# Patient Record
Sex: FEMALE | Race: WHITE | Employment: PART TIME | URBAN - METROPOLITAN AREA
[De-identification: names, ages, dates, MRNs, and addresses within clinical notes are randomized per-mention and may not be internally consistent; named-entity substitution may affect disease eponyms.]

---

## 2017-04-14 ENCOUNTER — HOSPITAL ENCOUNTER (OUTPATIENT)
Dept: MRI IMAGING | Age: 42
Discharge: HOME | End: 2017-04-14
Attending: FAMILY MEDICINE
Payer: MEDICAID

## 2017-04-14 DIAGNOSIS — G89.29 CHRONIC BACK PAIN: ICD-10-CM

## 2017-04-14 DIAGNOSIS — M54.9 CHRONIC BACK PAIN: ICD-10-CM

## 2017-04-14 PROCEDURE — 72148 MRI LUMBAR SPINE W/O DYE: CPT

## 2017-11-14 ENCOUNTER — HOSPITAL ENCOUNTER (OUTPATIENT)
Dept: GENERAL RADIOLOGY | Age: 42
Discharge: HOME | End: 2017-11-14
Payer: MEDICAID

## 2017-11-14 DIAGNOSIS — M79.671 RIGHT FOOT PAIN: ICD-10-CM

## 2017-11-14 PROCEDURE — 73660 X-RAY EXAM OF TOE(S): CPT

## 2017-11-14 PROCEDURE — 73620 X-RAY EXAM OF FOOT: CPT

## 2018-05-21 ENCOUNTER — HOSPITAL ENCOUNTER (OUTPATIENT)
Dept: GENERAL RADIOLOGY | Age: 43
Discharge: HOME | End: 2018-05-21
Payer: MEDICAID

## 2018-05-21 DIAGNOSIS — M54.2 NECK PAIN: ICD-10-CM

## 2018-05-21 DIAGNOSIS — S92.901A FRACTURE OF RIGHT FOOT: ICD-10-CM

## 2018-05-21 PROCEDURE — 72040 X-RAY EXAM NECK SPINE 2-3 VW: CPT

## 2018-05-21 PROCEDURE — 73620 X-RAY EXAM OF FOOT: CPT

## 2019-02-27 ENCOUNTER — OFFICE VISIT (OUTPATIENT)
Dept: ORTHOPEDIC SURGERY | Age: 44
End: 2019-02-27

## 2019-02-27 VITALS
WEIGHT: 159.6 LBS | RESPIRATION RATE: 16 BRPM | HEART RATE: 85 BPM | OXYGEN SATURATION: 96 % | TEMPERATURE: 97.9 F | BODY MASS INDEX: 25.65 KG/M2 | DIASTOLIC BLOOD PRESSURE: 77 MMHG | SYSTOLIC BLOOD PRESSURE: 112 MMHG | HEIGHT: 66 IN

## 2019-02-27 DIAGNOSIS — M47.816 LUMBAR SPONDYLOSIS: ICD-10-CM

## 2019-02-27 DIAGNOSIS — M48.02 CERVICAL STENOSIS OF SPINE: Primary | ICD-10-CM

## 2019-02-27 RX ORDER — TOPIRAMATE 25 MG/1
TABLET ORAL
Qty: 90 TAB | Refills: 0 | Status: SHIPPED | OUTPATIENT
Start: 2019-02-27 | End: 2021-11-02

## 2019-02-27 RX ORDER — DULOXETIN HYDROCHLORIDE 60 MG/1
60 CAPSULE, DELAYED RELEASE ORAL DAILY
COMMUNITY
End: 2021-11-02

## 2019-02-27 RX ORDER — KETOROLAC TROMETHAMINE 15 MG/ML
60 INJECTION, SOLUTION INTRAMUSCULAR; INTRAVENOUS ONCE
Qty: 1 VIAL | Refills: 0
Start: 2019-02-27 | End: 2019-02-27

## 2019-02-27 NOTE — PROGRESS NOTES
[address]  [address], [address]  [place], [facility] [address]  Phone: [phone]  Fax: [phone]        Franky Carrel, [place]  : 1975  PCP: Marcell Billings MD      NEW PATIENT      ASSESSMENT AND PLAN     Diagnoses and all orders for this visit:    1. Cervical stenosis of spine  -     REFERRAL TO PHYSICAL THERAPY  -     KETOROLAC TROMETHAMINE INJ  -     ketorolac (TORADOL) 15 mg/mL soln injection; 4 mL by IntraMUSCular route once for 1 dose. -     NH THER/PROPH/DIAG INJECTION, SUBCUT/IM  -     topiramate (TOPAMAX) 25 mg tablet; 1 tab po qhs for 1 week then increase to 2 tab po qhs for 1 week and then increase to 3 tabs po qhs    2. Lumbar spondylosis  -     REFERRAL TO PHYSICAL THERAPY  -     KETOROLAC TROMETHAMINE INJ  -     ketorolac (TORADOL) 15 mg/mL soln injection; 4 mL by IntraMUSCular route once for 1 dose. -     NH THER/PROPH/DIAG INJECTION, SUBCUT/IM  -     topiramate (TOPAMAX) 25 mg tablet; 1 tab po qhs for 1 week then increase to 2 tab po qhs for 1 week and then increase to 3 tabs po qhs       1. Advised to stay active as tolerated. 2. Trial of Topamax  3. Wean off Cymbalta  4. Toradol injection done today  6. Referral to PT  7. Given information on cervical stenosis    Follow-up Disposition:  Return in about 6 weeks (around 4/10/2019), or if symptoms worsen or fail to improve. CHIEF COMPLAINT  Manas Gu is seen today in consultation at the request of Dr. Regina Gonzalez for complaints of back and buttock pain. HISTORY OF PRESENT ILLNESS  Manas Gu is a 37 y.o. female. RHD. Today pt c/o back and buttock pain of few year duration. Pt denies any specific incident or injury that caused their pain. Pt reports 6-7 months of tingling, weakness and numbness in BUE. She admits she tosses and turns at night. She complains that upon waking her arms hurt deep within. Pt admits to cervicogenic headaches up to behind her ears.      She c/o low back pain with prolonged sitting or standing. She describes the pain prevents her from sitting through a movie at the theater. She states her low back hurts constantly. Location of pain: low back < neck  Does pain radiate into extremities: B/L buttock. LLE tingling. BUE paresthesias and weakness and pain. Between shoulder blades. Does patient have weakness: both hands   Pt denies saddle paresthesias. Medications pt is on: Cymbalta 60mg qD x 1 month no pain benefit. Pt denies recent ED visits or hospitalizations. Denies persistent fevers, chills, weight changes, neurogenic bowel or bladder symptoms. Pt denies any recent GI ulcers, bleeds or renal dysfucntion. Treatments patient has tried:  Physical therapy:No  Doing HEP: Unknown  Non-opioid medications: Yes Failed Cymbalta, Gabapentin  Spinal injections: No  Spinal surgery- No.   Last L MRI 2017: mild degenerative changes  Last C CT 2018: mod stenosis C5-6, mild stenosis C6-7     reviewed. PMHx of fibroids, depression, ankle surgery. Pt is unemployed. Pain Assessment  2/27/2019   Location of Pain Neck; Shoulder;Arm;Back; Hip   Location Modifiers Left; Inferior;Right   Severity of Pain 6   Quality of Pain Aching; Other (Comment)   Quality of Pain Comment Stabbing. N/T bilateral shoulders down to arms and lower back. Duration of Pain Persistent   Frequency of Pain Constant   Aggravating Factors Standing;Walking; Other (Comment)   Aggravating Factors Comment lifting and sitting   Limiting Behavior Some   Relieving Factors Nothing   Result of Injury No       CT cervical spine 12/25/2018  IMPRESSION:  No acute fractures or listhesis. Spondylosis and spinal stenosis at C4-5, 5-6 and C6-7 as described with moderate spinal stenosis at C5-6 and mild spinal stenosis at C6-7. XR cervical spine 2018  IMPRESSION:  1. Degenerative changes most pronounced at C5-C6. 2.  Kyphosis centered at C4-C5. MRI lumbar spine 2017  Impression:  1.   Mild degenerative changes. No significant canal or neural foraminal stenosis  appreciated level. PAST MEDICAL HISTORY   Past Medical History:   Diagnosis Date    Back pain     Fibroids     Heavy menses     and prolonged    Psychiatric disorder     depression       Past Surgical History:   Procedure Laterality Date    HX ORTHOPAEDIC      ankle surgery       MEDICATIONS    Current Outpatient Medications   Medication Sig Dispense Refill    buPROPion SR (WELLBUTRIN SR) 150 mg SR tablet Take 150 mg by mouth two (2) times a day.  traMADol (ULTRAM) 50 mg tablet Take 50 mg by mouth every six (6) hours as needed for Pain. ALLERGIES  No Known Allergies       SOCIAL HISTORY    Social History     Socioeconomic History    Marital status:      Spouse name: Not on file    Number of children: Not on file    Years of education: Not on file    Highest education level: Not on file   Social Needs    Financial resource strain: Not on file    Food insecurity - worry: Not on file    Food insecurity - inability: Not on file    Transportation needs - medical: Not on file   [employer] needs - non-medical: Not on file   Occupational History    Not on file   Tobacco Use    Smoking status: Former Smoker    Tobacco comment: 1 pk/week, quit 4 months ago   Substance and Sexual Activity    Alcohol use: Yes     Comment: states occassional    Drug use: No    Sexual activity: Not on file   Other Topics Concern    Not on file   Social History Narrative    Not on file       FAMILY HISTORY  Family History   Problem Relation Age of Onset    Diabetes Mother     Diabetes Father          REVIEW OF SYSTEMS  Review of Systems   Constitutional: Negative for chills, fever and weight loss. Respiratory: Negative for shortness of breath. Cardiovascular: Negative for chest pain. Gastrointestinal: Negative for constipation. Negative for fecal incontinence   Genitourinary: Negative for dysuria. Negative for urinary incontinence   Musculoskeletal:        Per HPI   Skin: Negative for rash. Neurological: Positive for tingling, focal weakness and headaches. Negative for dizziness and tremors. Endo/Heme/Allergies: Does not bruise/bleed easily. Psychiatric/Behavioral: The patient has insomnia. PHYSICAL EXAMINATION  Visit Vitals  /77   Pulse 85   Temp 97.9 °F (36.6 °C)   Resp 16   Ht 5' 6\" (1.676 m)   Wt 159 lb 9.6 oz (72.4 kg)   SpO2 96%   BMI 25.76 kg/m²          Accompanied by spouse. Constitutional:  Well developed, well nourished, in no acute distress. Psychiatric: Affect and mood are appropriate. Integumentary: No rashes or abrasions noted on exposed areas. Cardiovascular/Peripheral Vascular: Intact l pulses. No peripheral edema is noted BLE. Lymphatic:  No evidence of lymphedema. No cervical lymphadenopathy. SPINE/MUSCULOSKELETAL EXAM    Cervical spine:  Neck is midline. Normal muscle tone. No focal atrophy is noted. Tenderness to palpation mid cervical, B/L upper trapezii, B/L medial scapular border. Negative Spurling's sign. Positive Tinel's sign B/L elbows. Negative Olivo's sign. Sensation grossly intact to light touch. Lumbar spine:  No rash, ecchymosis, or gross obliquity. No fasciculations. No focal atrophy is noted. Tenderness to palpation L5-S1 bilaterally. No tenderness to palpation at the sciatic notch. SI joints non-tender. Trochanters non tender. Sensation grossly intact to light touch. MOTOR:      Biceps  Triceps Deltoids Wrist Ext Wrist Flex Hand Intrin   Right +4/5 +4/5 +4/5 +4/5 +4/5 4/5   Left +4/5 +4/5 +4/5 +4/5 +4/5 4/5   Pinch intact bilaterally. DTRs are 2+ biceps, triceps, brachioradialis, patella, and Achilles. No difficulty with tandem gait. Heel walk intact. Toe rise intact. Ambulation without assistive device. FWB.       Written by Lawyer Pond, as dictated by Bimal Thompson MD.    I, Dr. Gary Quick Kathryn Asif MD, confirm that all documentation is accurate. Ms. Deepti Peterson may have a reminder for a \"due or due soon\" health maintenance. I have asked that she contact her primary care provider for follow-up on this health maintenance.

## 2019-02-27 NOTE — PATIENT INSTRUCTIONS
Cervical Spinal Stenosis: Care Instructions  Your Care Instructions    Spinal stenosis is a narrowing of the canal that surrounds the spinal cord and nerve roots. Sometimes bone and other tissue grow into this canal and press on the nerves that branch out from the spinal cord. This can happen as a part of aging. When the narrowing happens in your neck, it's called cervical spinal stenosis. It often causes stiffness, pain, numbness, and weakness in the neck, shoulders, arms, hands, or legs. It can even cause problems with your balance, coordination, and bowel or bladder control. But some people have no symptoms. You may be able to get relief from the symptoms of spinal stenosis by taking pain medicine. Your doctor may suggest physical therapy and exercises to keep your spine strong and flexible. Some people try steroid shots to reduce swelling. If pain and numbness in your neck, arms, or legs are still so bad that you cannot do your normal activities, you may need surgery. Follow-up care is a key part of your treatment and safety. Be sure to make and go to all appointments, and call your doctor if you are having problems. It's also a good idea to know your test results and keep a list of the medicines you take. How can you care for yourself at home? · Ask your doctor if you can take an over-the-counter pain medicine, such as acetaminophen (Tylenol), ibuprofen (Advil, Motrin), or naproxen (Aleve). Be safe with medicines. Read and follow all instructions on the label. · Do not take two or more pain medicines at the same time unless the doctor told you to. Many pain medicines have acetaminophen, which is Tylenol. Too much acetaminophen (Tylenol) can be harmful. · Change positions often when you are standing or sitting. This may reduce pressure on the spinal cord and its nerves. · When you rest, use pillows or towel rolls to support your neck and head in a comfortable position.   · Follow your doctor's instructions about activity. He or she may tell you not to do sports or activities that could injure your neck. · Stretch your neck and shoulders as your doctor or physical therapist recommends. If your doctor says it is okay to do them, these exercises may help:  ? Neck stretches to the side. Keep your shoulders relaxed and slowly tilt your head straight over toward one shoulder. Hold for 15 seconds. Let the weight of your head stretch your muscles. Then do the same toward the other shoulder. ? Neck rotations. Keep your chin level and slowly turn your head to one side. Hold for 15 seconds. Then do the same to the other side. ? Shoulder rolls. Roll your shoulders up, then back, and then down in a smooth, circular motion. Repeat several times. When should you call for help? Call 911 anytime you think you may need emergency care. For example, call if:    · You are unable to move an arm or a leg at all.   [facility] your doctor now or seek immediate medical care if:    · You have new or worse symptoms in your arms, legs, belly, or buttocks. Symptoms may include:  ? Numbness or tingling. ? Weakness. ? Pain.     · You lose bladder or bowel control.    Watch closely for changes in your health, and be sure to contact your doctor if:    · You do not get better as expected. Where can you learn more? Go to http://eros-nona.info/. Enter  in the search box to learn more about \"Cervical Spinal Stenosis: Care Instructions. \"  Current as of: September 20, 2018  Content Version: 11.9  © 4449-5693 Healthwise, Incorporated. Care instructions adapted under license by [employer] (which disclaims liability or warranty for this information). If you have questions about a medical condition or this instruction, always ask your healthcare professional. [place] [address] any warranty or liability for your use of this information.

## 2019-02-27 NOTE — PROGRESS NOTES
Verbal order entered per Dr. Peralta Box as documented on blue sheet:  -Gabapentin added to allergies for nausea  -Topamax 25mg 1 tab po qhs for 1 week then increase to 2 tab po qhs for 1 week and then increase to 3 tabs po qhs Disp: 90  -Physical Therapy-Evaluate and treat. Cervical stenosis and LBP.   -toradol 60 mg IM today

## 2021-11-02 ENCOUNTER — HOSPITAL ENCOUNTER (EMERGENCY)
Age: 46
Discharge: HOME | End: 2021-11-02
Attending: STUDENT IN AN ORGANIZED HEALTH CARE EDUCATION/TRAINING PROGRAM
Payer: MEDICAID

## 2021-11-02 VITALS
TEMPERATURE: 98.2 F | SYSTOLIC BLOOD PRESSURE: 111 MMHG | RESPIRATION RATE: 18 BRPM | DIASTOLIC BLOOD PRESSURE: 69 MMHG | OXYGEN SATURATION: 96 % | WEIGHT: 137 LBS | BODY MASS INDEX: 22.11 KG/M2 | HEART RATE: 65 BPM

## 2021-11-02 DIAGNOSIS — J01.90 ACUTE NON-RECURRENT SINUSITIS, UNSPECIFIED LOCATION: ICD-10-CM

## 2021-11-02 DIAGNOSIS — J02.9 ACUTE PHARYNGITIS, UNSPECIFIED ETIOLOGY: Primary | ICD-10-CM

## 2021-11-02 PROCEDURE — 99282 EMERGENCY DEPT VISIT SF MDM: CPT

## 2021-11-02 PROCEDURE — 74011250637 HC RX REV CODE- 250/637: Performed by: PHYSICIAN ASSISTANT

## 2021-11-02 RX ORDER — BENZONATATE 100 MG/1
100 CAPSULE ORAL
Status: COMPLETED | OUTPATIENT
Start: 2021-11-02 | End: 2021-11-02

## 2021-11-02 RX ORDER — BENZONATATE 100 MG/1
100 CAPSULE ORAL
Qty: 30 CAPSULE | Refills: 0 | Status: SHIPPED | OUTPATIENT
Start: 2021-11-02 | End: 2021-11-09

## 2021-11-02 RX ORDER — AMOXICILLIN AND CLAVULANATE POTASSIUM 875; 125 MG/1; MG/1
1 TABLET, FILM COATED ORAL
Status: COMPLETED | OUTPATIENT
Start: 2021-11-02 | End: 2021-11-02

## 2021-11-02 RX ORDER — AMOXICILLIN AND CLAVULANATE POTASSIUM 875; 125 MG/1; MG/1
1 TABLET, FILM COATED ORAL 2 TIMES DAILY
Qty: 20 TABLET | Refills: 0 | Status: SHIPPED | OUTPATIENT
Start: 2021-11-02

## 2021-11-02 RX ORDER — SERTRALINE HYDROCHLORIDE 50 MG/1
TABLET, FILM COATED ORAL DAILY
COMMUNITY

## 2021-11-02 RX ORDER — PSEUDOEPHEDRINE HCL 120 MG/1
120 TABLET, FILM COATED, EXTENDED RELEASE ORAL
Qty: 14 TABLET | Refills: 0 | Status: SHIPPED | OUTPATIENT
Start: 2021-11-02

## 2021-11-02 RX ADMIN — BENZONATATE 100 MG: 100 CAPSULE ORAL at 21:37

## 2021-11-02 RX ADMIN — AMOXICILLIN AND CLAVULANATE POTASSIUM 1 TABLET: 875; 125 TABLET, FILM COATED ORAL at 21:37

## 2021-11-03 NOTE — DISCHARGE INSTRUCTIONS
Zipdial Activation    Thank you for requesting access to Zipdial. Please follow the instructions below to securely access and download your online medical record. Zipdial allows you to send messages to your doctor, view your test results, renew your prescriptions, schedule appointments, and more. How Do I Sign Up? In your internet browser, go to [email or web address]  Click on the First Time User? Click Here link in the Sign In box. You will be redirect to the New Member Sign Up page. Enter your Zipdial Access Code exactly as it appears below. You will not need to use this code after youve completed the sign-up process. If you do not sign up before the expiration date, you must request a new code. Zipdial Access Code: [unfilled] (This is the date your Zipdial access code will )    Enter the last four digits of your Social Security Number (xxxx) and Date of Birth (mm/dd/yyyy) as indicated and click Submit. You will be taken to the next sign-up page. Create a Zipdial ID. This will be your Zipdial login ID and cannot be changed, so think of one that is secure and easy to remember. Create a Zipdial password. You can change your password at any time. Enter your Password Reset Question and Answer. This can be used at a later time if you forget your password. Enter your e-mail address. You will receive e-mail notification when new information is available in [address] Click Sign Up. You can now view and download portions of your medical record. Click the [place] link to download a portable copy of your medical information. Additional Information    If you have questions, please visit the Frequently Asked Questions section of the Zipdial website at [email or web address] Remember, Zipdial is NOT to be used for urgent needs. For medical emergencies, dial 911.

## 2021-11-03 NOTE — ED PROVIDER NOTES
EMERGENCY DEPARTMENT HISTORY AND PHYSICAL EXAM    Date: 11/2/2021  Patient Name: Edwin Escobar    History of Presenting Illness     Chief Complaint   Patient presents with    Sore Throat    Sinus Pain         History Provided By: patient   Chief Complaint: sinus pressure, ear pain, sore throat   Duration: few days   Timing:  acute  Location: upper resp  Quality:pressure like   Severity:moderate   Modifying Factors:none    Associated Symptoms: sinus pressure/pain, congestion, ear pain, sore throat, dry cough       Additional History (Context): Edwin Escobar is a 55 y.o. female with PMH depression and uterine fibroids who presents with c/o a few days of sinus pressure/pain, congestion, sore throat, bilateral ear pain and a dry cough. Denies tx for her sx PTA. Denies any concerns for COVID and states she is fully COVID vaccinated. No other complaints reported at this time. PCP: Elías Veronica MD    Current Outpatient Medications   Medication Sig Dispense Refill    sertraline (Zoloft) 50 mg tablet Take  by mouth daily.  amoxicillin-clavulanate (Augmentin) 875-125 mg per tablet Take 1 Tablet by mouth two (2) times a day. 20 Tablet 0    pseudoephedrine CR (Sudafed 12 Hour) 120 mg CR tablet Take 1 Tablet by mouth two (2) times daily as needed for Congestion. 14 Tablet 0    benzonatate (Tessalon Perles) 100 mg capsule Take 1 Capsule by mouth three (3) times daily as needed for Cough for up to 7 days.  30 Capsule 0       Past History     Past Medical History:  Past Medical History:   Diagnosis Date    Back pain     Fibroids     Heavy menses     and prolonged    Psychiatric disorder     depression       Past Surgical History:  Past Surgical History:   Procedure Laterality Date    HX HYSTERECTOMY  01/2016    HX ORTHOPAEDIC      ankle surgery       Family History:  Family History   Problem Relation Age of Onset    Diabetes Mother     Diabetes Father        Social History:  Social History Tobacco Use    Smoking status: Former Smoker    Smokeless tobacco: Never Used    Tobacco comment: 1 pk/week, quit 4 months ago   Substance Use Topics    Alcohol use: Yes     Comment: states occassional    Drug use: No       Allergies: Allergies   Allergen Reactions    Gabapentin Nausea Only         Review of Systems   Review of Systems   Constitutional: Negative. Negative for chills and fever. HENT: Positive for congestion, ear pain, sinus pressure, sinus pain and sore throat. Negative for rhinorrhea. Eyes: Negative. Negative for pain and redness. Respiratory: Positive for cough. Negative for shortness of breath, wheezing and stridor. Cardiovascular: Negative. Negative for chest pain and leg swelling. Gastrointestinal: Negative. Negative for abdominal pain, constipation, diarrhea, nausea and vomiting. Genitourinary: Negative. Negative for dysuria and frequency. Musculoskeletal: Negative. Negative for back pain and neck pain. Skin: Negative. Negative for rash and wound. Neurological: Negative. Negative for dizziness, seizures, syncope and headaches. All other systems reviewed and are negative. All Other Systems Negative  Physical Exam     Vitals:    11/02/21 2036   BP: 111/69   Pulse: 65   Resp: 18   Temp: 98.2 °F (36.8 °C)   SpO2: 96%   Weight: 62.1 kg (137 lb)     Physical Exam  Vitals and nursing note reviewed. Constitutional:       General: She is not in acute distress. Appearance: She is well-developed. She is not diaphoretic. HENT:      Head: Normocephalic and atraumatic. Right Ear: Tympanic membrane, ear canal and external ear normal. There is no impacted cerumen. Left Ear: Tympanic membrane, ear canal and external ear normal. There is no impacted cerumen. Nose: Congestion present. Mouth/Throat:      Mouth: Mucous membranes are moist.      Pharynx: Oropharyngeal exudate and posterior oropharyngeal erythema present.    Eyes:      General: No scleral icterus. Right eye: No discharge. Left eye: No discharge. Conjunctiva/sclera: Conjunctivae normal.   Cardiovascular:      Rate and Rhythm: Normal rate and regular rhythm. Heart sounds: Normal heart sounds. No murmur heard. No friction rub. No gallop. Pulmonary:      Effort: Pulmonary effort is normal. No respiratory distress. Breath sounds: Normal breath sounds. No stridor. No wheezing, rhonchi or rales. Musculoskeletal:         General: Normal range of motion. Cervical back: Normal range of motion and neck supple. Skin:     General: Skin is warm and dry. Findings: No erythema or rash. Neurological:      Mental Status: She is alert and oriented to person, place, and time. Coordination: Coordination normal.      Comments: Gait is steady and patient exhibits no evidence of ataxia. Patient is able to ambulate without difficulty. No focal neurological deficit noted. No facial droop, slurred speech, or evidence of altered mentation noted on exam.     Psychiatric:         Behavior: Behavior normal.         Thought Content: Thought content normal.              Diagnostic Study Results     Labs -   No results found for this or any previous visit (from the past 12 hour(s)). Radiologic Studies -   No orders to display     CT Results  (Last 48 hours)    None        CXR Results  (Last 48 hours)    None            Medical Decision Making   I am the first provider for this patient. I reviewed the vital signs, available nursing notes, past medical history, past surgical history, family history and social history. Vital Signs-Reviewed the patient's vital signs. Records Reviewed: Desirae Campuzano PA-C     Procedures:  Procedures    Provider Notes (Medical Decision Making): Impression:  Sinusitis, cough, acute pharyngitis     Pt declined covid and strep testing in the ED. She hd oropharyngeal exudates on exam as well as sinus pressure.  Will plan to treat with augmentin sudafed and tessalon with pcp follow-up. Pt agrees. Desirae Campuzano PA-C       MED RECONCILIATION:  No current facility-administered medications for this encounter. Current Outpatient Medications   Medication Sig    sertraline (Zoloft) 50 mg tablet Take  by mouth daily.  amoxicillin-clavulanate (Augmentin) 875-125 mg per tablet Take 1 Tablet by mouth two (2) times a day.  pseudoephedrine CR (Sudafed 12 Hour) 120 mg CR tablet Take 1 Tablet by mouth two (2) times daily as needed for Congestion.  benzonatate (Tessalon Perles) 100 mg capsule Take 1 Capsule by mouth three (3) times daily as needed for Cough for up to 7 days. Disposition:  d/c    DISCHARGE NOTE:   Patient is stable for discharge at this time. I have discussed all the findings from today's work up with the patient, including lab results and imaging. I have answered all questions. Rx for augmentin tessalon and sudafed given. Rest and close follow-up with the PCP recommended this week. Return to the ED immediately for any new or worsening symptoms. Desirae Murillo PA-C     Follow-up Information     Follow up With Specialties Details Why Contact Info    Stacy Aschoff, MD Family Medicine In 1 week  [address] [facility] [address]  [phone]      [facility] EMERGENCY DEPT Emergency Medicine  As needed, If symptoms worsen [address] [facility] [address]  [phone]          Current Discharge Medication List      START taking these medications    Details   amoxicillin-clavulanate (Augmentin) 875-125 mg per tablet Take 1 Tablet by mouth two (2) times a day. Qty: 20 Tablet, Refills: 0  Start date: 11/2/2021      pseudoephedrine CR (Sudafed 12 Hour) 120 mg CR tablet Take 1 Tablet by mouth two (2) times daily as needed for Congestion.   Qty: 14 Tablet, Refills: 0  Start date: 11/2/2021      benzonatate (Tessalon Perles) 100 mg capsule Take 1 Capsule by mouth three (3) times daily as needed for Cough for up to 7 days.  Qty: 30 Capsule, Refills: 0  Start date: 11/2/2021, End date: 11/9/2021                 Diagnosis     Clinical Impression:   1. Acute pharyngitis, unspecified etiology    2.  Acute non-recurrent sinusitis, unspecified location

## 2021-11-20 ENCOUNTER — HOSPITAL ENCOUNTER (EMERGENCY)
Age: 46
Discharge: HOME | End: 2021-11-20
Attending: STUDENT IN AN ORGANIZED HEALTH CARE EDUCATION/TRAINING PROGRAM
Payer: MEDICAID

## 2021-11-20 VITALS
DIASTOLIC BLOOD PRESSURE: 92 MMHG | HEART RATE: 92 BPM | SYSTOLIC BLOOD PRESSURE: 117 MMHG | TEMPERATURE: 98.7 F | OXYGEN SATURATION: 99 % | RESPIRATION RATE: 18 BRPM

## 2021-11-20 DIAGNOSIS — J02.8 VIRAL SORE THROAT: Primary | ICD-10-CM

## 2021-11-20 DIAGNOSIS — B97.89 VIRAL SORE THROAT: Primary | ICD-10-CM

## 2021-11-20 LAB — DEPRECATED S PYO AG THROAT QL EIA: NEGATIVE

## 2021-11-20 PROCEDURE — 99281 EMR DPT VST MAYX REQ PHY/QHP: CPT

## 2021-11-20 PROCEDURE — 87070 CULTURE OTHR SPECIMN AEROBIC: CPT

## 2021-11-20 PROCEDURE — 87147 CULTURE TYPE IMMUNOLOGIC: CPT

## 2021-11-20 PROCEDURE — 87880 STREP A ASSAY W/OPTIC: CPT

## 2021-11-20 RX ORDER — PHENOL 1.4 %
3 AEROSOL, SPRAY (ML) MUCOUS MEMBRANE
Qty: 177 ML | Refills: 0 | Status: SHIPPED | OUTPATIENT
Start: 2021-11-20

## 2021-11-20 RX ORDER — ALBUTEROL SULFATE 90 UG/1
2 AEROSOL, METERED RESPIRATORY (INHALATION)
Qty: 1 EACH | Refills: 2 | Status: SHIPPED | OUTPATIENT
Start: 2021-11-20 | End: 2021-11-25

## 2021-11-20 RX ORDER — DEXAMETHASONE 6 MG/1
TABLET ORAL
Qty: 1 TABLET | Refills: 0 | Status: SHIPPED | OUTPATIENT
Start: 2021-11-20

## 2021-11-20 NOTE — ED PROVIDER NOTES
EMERGENCY DEPARTMENT HISTORY AND PHYSICAL EXAM    3:27 PM    Date: 11/20/2021  Patient Name: Cyndi Pollack    History of Presenting Illness     No chief complaint on file. History Provided By: Patient  Location/Duration/Severity/Modifying factors   HPI  Cyndi Pollack is a 55 y.o. female with a past medical history of tobacco use presenting for sore throat. For the last 3 to 4 days she has had a sore throat. She says it feels like the lymph nodes in the front of her neck are enlarged. She has been coughing, but has not had any fevers. She has a little bit of an ache on both sides of her ears. Denies any shortness of breath, known sick contacts, chest pain, abdominal pain, nausea or vomiting. She has been taking Motrin with some relief of her pain. Able to eat and drink okay. No other medical complaints. PCP: Saedi Carroll MD    Current Outpatient Medications   Medication Sig Dispense Refill    dexAMETHasone (Decadron) 6 mg tablet Take one tablet 1 Tablet 0    phenoL (Chloraseptic) 0.5 % spra 3 Actuation(s) by Mucous Membrane route every six to eight (6-8) hours as needed for Pain. 177 mL 0    albuterol (PROVENTIL HFA, VENTOLIN HFA, PROAIR HFA) 90 mcg/actuation inhaler Take 2 Puffs by inhalation every four (4) hours as needed for Wheezing or Shortness of Breath for up to 5 days. 1 Each 2    sertraline (Zoloft) 50 mg tablet Take  by mouth daily.  amoxicillin-clavulanate (Augmentin) 875-125 mg per tablet Take 1 Tablet by mouth two (2) times a day. 20 Tablet 0    pseudoephedrine CR (Sudafed 12 Hour) 120 mg CR tablet Take 1 Tablet by mouth two (2) times daily as needed for Congestion.  14 Tablet 0       Past History     Past Medical History:  Past Medical History:   Diagnosis Date    Back pain     Fibroids     Heavy menses     and prolonged    Psychiatric disorder     depression       Past Surgical History:  Past Surgical History:   Procedure Laterality Date    HX HYSTERECTOMY 01/2016    HX ORTHOPAEDIC      ankle surgery       Family History:  Family History   Problem Relation Age of Onset    Diabetes Mother     Diabetes Father        Social History:  Social History     Tobacco Use    Smoking status: Former Smoker    Smokeless tobacco: Never Used    Tobacco comment: 1 pk/week, quit 4 months ago   Substance Use Topics    Alcohol use: Yes     Comment: states occassional    Drug use: No       Allergies: Allergies   Allergen Reactions    Gabapentin Nausea Only       I reviewed and confirmed the above information with patient and updated as necessary. Review of Systems     Review of Systems   Constitutional: Negative for diaphoresis and fever. HENT: Positive for ear pain and sore throat. Negative for mouth sores and trouble swallowing. Eyes:        No acute change in vision   Respiratory: Negative for cough and shortness of breath. Cardiovascular: Negative for chest pain and leg swelling. Gastrointestinal: Negative for abdominal pain and vomiting. Genitourinary: Negative for dysuria. Musculoskeletal: Negative for neck pain. Skin: Negative for wound. Neurological: Negative for weakness and headaches. Physical Exam     Visit Vitals  BP (!) 117/92 (BP 1 Location: Left upper arm, BP Patient Position: Sitting)   Pulse 92   Temp 98.7 °F (37.1 °C)   Resp 18   LMP 01/12/2017 Comment: neg preg 1/21/17   SpO2 99%       Physical Exam  Vitals and nursing note reviewed. Constitutional:       Comments: Is a female resting comfortably   HENT:      Mouth/Throat:      Mouth: Mucous membranes are moist.      Pharynx: Posterior oropharyngeal erythema present. No oropharyngeal exudate. Eyes:      Pupils: Pupils are equal, round, and reactive to light. Cardiovascular:      Rate and Rhythm: Normal rate and regular rhythm. Pulses: Normal pulses. Pulmonary:      Effort: Pulmonary effort is normal.      Breath sounds: Normal breath sounds.    Abdominal:      Palpations: Abdomen is soft. Tenderness: There is no abdominal tenderness. Musculoskeletal:         General: No signs of injury. Normal range of motion. Cervical back: Normal range of motion. Lymphadenopathy:      Cervical: Cervical adenopathy present. Skin:     General: Skin is warm. Neurological:      General: No focal deficit present. Mental Status: She is alert and oriented to person, place, and time. Mental status is at baseline. Diagnostic Study Results     Labs -  Recent Results (from the past 24 hour(s))   STREP AG SCREEN, GROUP A    Collection Time: 11/20/21  3:40 PM    Specimen: Throat   Result Value Ref Range    Group A Strep Ag ID Negative           Radiologic Studies -   No orders to display           Medical Decision Making   I am the first provider for this patient. I reviewed the vital signs, available nursing notes, past medical history, past surgical history, family history and social history. Vital Signs-Reviewed the patient's vital signs. Records Reviewed: Nursing Notes and Old Medical Records (Time of Review: 3:27 PM)    Provider Notes (Medical Decision Making):   MDM  70-year-old female here for sore throat, likely viral nature but also evaluate for strep throat. No fevers, chills, impending airway issues. ED Course: Progress Notes, Reevaluation, and Consults:  Patient arrives afebrile, hemodynamically normal  Resting comfortably, handling her secretions well  Erythema at the back of the throat but no exudates, no fever. Her center score of 2 however will obtain a rapid strep swab as well as a strep culture at the patient's request.    Strep test is negative, patient feels well and can be discharged home. We will send with a work note. Signs symptoms prompting return to the emergency department were discussed. Will treat symptomatically. Discharged home in stable condition. Procedures    Diagnosis     Clinical Impression:   1.  Viral sore throat Disposition: Home    Follow-up Information     Follow up With Specialties Details Why [address] [facility]    [facility] EMERGENCY DEPT Emergency Medicine  As needed, If symptoms worsen [place] Marelyn Osler, MD Family Medicine Schedule an appointment as soon as possible for a visit   [address] [place] [address]  [phone]             Discharge Medication List as of 11/20/2021  4:31 PM      START taking these medications    Details   dexAMETHasone (Decadron) 6 mg tablet Take one tablet, Print, Disp-1 Tablet, R-0      phenoL (Chloraseptic) 0.5 % spra 3 Actuation(s) by Mucous Membrane route every six to eight (6-8) hours as needed for Pain., Print, Disp-177 mL, R-0      albuterol (PROVENTIL HFA, VENTOLIN HFA, PROAIR HFA) 90 mcg/actuation inhaler Take 2 Puffs by inhalation every four (4) hours as needed for Wheezing or Shortness of Breath for up to 5 days. , Print, Disp-1 Each, R-2         CONTINUE these medications which have NOT CHANGED    Details   sertraline (Zoloft) 50 mg tablet Take  by mouth daily. , Historical Med      amoxicillin-clavulanate (Augmentin) 875-125 mg per tablet Take 1 Tablet by mouth two (2) times a day., Normal, Disp-20 Tablet, R-0      pseudoephedrine CR (Sudafed 12 Hour) 120 mg CR tablet Take 1 Tablet by mouth two (2) times daily as needed for Congestion. , Normal, Disp-14 Tablet, R-0             Janes Goldberg MD   Emergency Medicine   November 20, 2021, 3:27 PM     This note is dictated utilizing Dragon voice recognition software. Unfortunately this leads to occasional typographical errors using the voice recognition. I apologize in advance if the situation occurs. If questions occur please do not hesitate to contact me directly.     Zo Lopez MD

## 2021-11-20 NOTE — Clinical Note
[facility]  [address] [facility] EMERGENCY DEPT  [id number] [facility] [address] [phone]    Work/School Note    Date: 11/20/2021    To Whom It May concern:    [place] was seen and treated today in the emergency room by the following provider(s):  Attending Provider: Rosario Hopper MD.      [place] is excused from work/school on 11/20/2021 through 11/22/2021. She is medically clear to return to work/school on 11/23/2021.          Sincerely,          Montez Gilman MD

## 2021-11-20 NOTE — Clinical Note
[facility]  [facility] EMERGENCY DEPT  [address] [id number] [phone]    Work/School Note    Date: 11/20/2021    To Whom It May concern:    [place] was seen and treated today in the emergency room by the following provider(s):  Attending Provider: Traci Bran MD.      [place] is excused from work/school on 11/20/21 and 11/21/21. She is medically clear to return to work/school on 11/22/2021.        Sincerely,          Rusty Noyola MD

## 2021-11-20 NOTE — DISCHARGE INSTRUCTIONS
Please call your primary care doctor to schedule a follow-up appointment. Use the steroid tablet as well as the Chloraseptic spray to help with your sore throat symptoms    We will call you in about 3 days if the results from your strep culture positive, however your testing so far is negative.

## 2021-11-20 NOTE — ED TRIAGE NOTES
Pt. Complaining of ear and throat pain X 1 day. Alert and oriented times 4. Vitals stable. Reports working at [employer] exposed to grease regularly. Pt. Returned to waiting room pending provider evaluation.

## 2021-11-20 NOTE — Clinical Note
[address] [place] [address] [address] [facility] EMERGENCY DEPT  [id number] [facility] [id number] [phone]    Work/School Note    Date: 11/20/2021    To Whom It May concern:    [place] was seen and treated today in the emergency room by the following provider(s):  Attending Provider: Lynette Thorpe MD.      [place] is excused from work/school on 11/20/21 and 11/21/21. She is medically clear to return to work/school on 11/22/2021.        Sincerely,          Mack Shah MD

## 2021-11-22 LAB
BACTERIA SPEC CULT: ABNORMAL
BACTERIA SPEC CULT: ABNORMAL
SERVICE CMNT-IMP: ABNORMAL

## 2022-01-14 ENCOUNTER — HOSPITAL ENCOUNTER (EMERGENCY)
Age: 47
Discharge: HOME | End: 2022-01-14
Attending: EMERGENCY MEDICINE
Payer: MEDICAID

## 2022-01-14 ENCOUNTER — APPOINTMENT (OUTPATIENT)
Dept: CT IMAGING | Age: 47
End: 2022-01-14
Attending: NURSE PRACTITIONER
Payer: MEDICAID

## 2022-01-14 VITALS
HEART RATE: 74 BPM | WEIGHT: 143 LBS | BODY MASS INDEX: 23.82 KG/M2 | OXYGEN SATURATION: 99 % | DIASTOLIC BLOOD PRESSURE: 96 MMHG | HEIGHT: 65 IN | SYSTOLIC BLOOD PRESSURE: 119 MMHG | RESPIRATION RATE: 14 BRPM | TEMPERATURE: 98.5 F

## 2022-01-14 DIAGNOSIS — R11.2 NON-INTRACTABLE VOMITING WITH NAUSEA, UNSPECIFIED VOMITING TYPE: Primary | ICD-10-CM

## 2022-01-14 DIAGNOSIS — Z20.822 EXPOSURE TO COVID-19 VIRUS: ICD-10-CM

## 2022-01-14 LAB
ALBUMIN SERPL-MCNC: 3.6 G/DL (ref 3.4–5)
ALBUMIN/GLOB SERPL: 1 {RATIO} (ref 0.8–1.7)
ALP SERPL-CCNC: 83 U/L (ref 45–117)
ALT SERPL-CCNC: 37 U/L (ref 13–56)
ANION GAP SERPL CALC-SCNC: 3 MMOL/L (ref 3–18)
APPEARANCE UR: CLEAR
AST SERPL-CCNC: 21 U/L (ref 10–38)
BASOPHILS # BLD: 0 K/UL (ref 0–0.1)
BASOPHILS NFR BLD: 0 % (ref 0–2)
BILIRUB SERPL-MCNC: 1.2 MG/DL (ref 0.2–1)
BILIRUB UR QL: NEGATIVE
BUN SERPL-MCNC: 14 MG/DL (ref 7–18)
BUN/CREAT SERPL: 18 (ref 12–20)
CALCIUM SERPL-MCNC: 9.4 MG/DL (ref 8.5–10.1)
CHLORIDE SERPL-SCNC: 107 MMOL/L (ref 100–111)
CO2 SERPL-SCNC: 30 MMOL/L (ref 21–32)
COLOR UR: YELLOW
CREAT SERPL-MCNC: 0.8 MG/DL (ref 0.6–1.3)
DIFFERENTIAL METHOD BLD: ABNORMAL
EOSINOPHIL # BLD: 0.2 K/UL (ref 0–0.4)
EOSINOPHIL NFR BLD: 2 % (ref 0–5)
ERYTHROCYTE [DISTWIDTH] IN BLOOD BY AUTOMATED COUNT: 12.4 % (ref 11.6–14.5)
GLOBULIN SER CALC-MCNC: 3.6 G/DL (ref 2–4)
GLUCOSE SERPL-MCNC: 96 MG/DL (ref 74–99)
GLUCOSE UR STRIP.AUTO-MCNC: NEGATIVE MG/DL
HCG SERPL QL: NEGATIVE
HCT VFR BLD AUTO: 47.7 % (ref 35–45)
HGB BLD-MCNC: 15.7 G/DL (ref 12–16)
HGB UR QL STRIP: NEGATIVE
IMM GRANULOCYTES # BLD AUTO: 0 K/UL (ref 0–0.04)
IMM GRANULOCYTES NFR BLD AUTO: 0 % (ref 0–0.5)
KETONES UR QL STRIP.AUTO: NEGATIVE MG/DL
LEUKOCYTE ESTERASE UR QL STRIP.AUTO: NEGATIVE
LIPASE SERPL-CCNC: 120 U/L (ref 73–393)
LYMPHOCYTES # BLD: 2.6 K/UL (ref 0.9–3.6)
LYMPHOCYTES NFR BLD: 24 % (ref 21–52)
MCH RBC QN AUTO: 29.2 PG (ref 24–34)
MCHC RBC AUTO-ENTMCNC: 32.9 G/DL (ref 31–37)
MCV RBC AUTO: 88.7 FL (ref 78–100)
MONOCYTES # BLD: 1.1 K/UL (ref 0.05–1.2)
MONOCYTES NFR BLD: 10 % (ref 3–10)
NEUTS SEG # BLD: 6.9 K/UL (ref 1.8–8)
NEUTS SEG NFR BLD: 64 % (ref 40–73)
NITRITE UR QL STRIP.AUTO: NEGATIVE
NRBC # BLD: 0 K/UL (ref 0–0.01)
NRBC BLD-RTO: 0 PER 100 WBC
PH UR STRIP: 6.5 [PH] (ref 5–8)
PLATELET # BLD AUTO: 222 K/UL (ref 135–420)
PMV BLD AUTO: 9.7 FL (ref 9.2–11.8)
POTASSIUM SERPL-SCNC: 3.6 MMOL/L (ref 3.5–5.5)
PROT SERPL-MCNC: 7.2 G/DL (ref 6.4–8.2)
PROT UR STRIP-MCNC: NEGATIVE MG/DL
RBC # BLD AUTO: 5.38 M/UL (ref 4.2–5.3)
SODIUM SERPL-SCNC: 140 MMOL/L (ref 136–145)
SP GR UR REFRACTOMETRY: 1.02 (ref 1–1.03)
UROBILINOGEN UR QL STRIP.AUTO: 0.2 EU/DL (ref 0.2–1)
WBC # BLD AUTO: 10.8 K/UL (ref 4.6–13.2)

## 2022-01-14 PROCEDURE — 84703 CHORIONIC GONADOTROPIN ASSAY: CPT

## 2022-01-14 PROCEDURE — 74011000636 HC RX REV CODE- 636: Performed by: EMERGENCY MEDICINE

## 2022-01-14 PROCEDURE — 96374 THER/PROPH/DIAG INJ IV PUSH: CPT

## 2022-01-14 PROCEDURE — 83690 ASSAY OF LIPASE: CPT

## 2022-01-14 PROCEDURE — 80053 COMPREHEN METABOLIC PANEL: CPT

## 2022-01-14 PROCEDURE — 81003 URINALYSIS AUTO W/O SCOPE: CPT

## 2022-01-14 PROCEDURE — 85025 COMPLETE CBC W/AUTO DIFF WBC: CPT

## 2022-01-14 PROCEDURE — 99283 EMERGENCY DEPT VISIT LOW MDM: CPT

## 2022-01-14 PROCEDURE — 96375 TX/PRO/DX INJ NEW DRUG ADDON: CPT

## 2022-01-14 PROCEDURE — 74177 CT ABD & PELVIS W/CONTRAST: CPT

## 2022-01-14 PROCEDURE — 74011250636 HC RX REV CODE- 250/636: Performed by: NURSE PRACTITIONER

## 2022-01-14 RX ORDER — MORPHINE SULFATE 4 MG/ML
4 INJECTION INTRAVENOUS
Status: COMPLETED | OUTPATIENT
Start: 2022-01-14 | End: 2022-01-14

## 2022-01-14 RX ORDER — ONDANSETRON 4 MG/1
4 TABLET, ORALLY DISINTEGRATING ORAL
Qty: 12 TABLET | Refills: 0 | Status: SHIPPED | OUTPATIENT
Start: 2022-01-14

## 2022-01-14 RX ORDER — ONDANSETRON 2 MG/ML
4 INJECTION INTRAMUSCULAR; INTRAVENOUS
Status: COMPLETED | OUTPATIENT
Start: 2022-01-14 | End: 2022-01-14

## 2022-01-14 RX ADMIN — ONDANSETRON 4 MG: 2 INJECTION INTRAMUSCULAR; INTRAVENOUS at 15:09

## 2022-01-14 RX ADMIN — SODIUM CHLORIDE 1000 ML: 900 INJECTION, SOLUTION INTRAVENOUS at 15:12

## 2022-01-14 RX ADMIN — IOPAMIDOL 100 ML: 612 INJECTION, SOLUTION INTRAVENOUS at 15:45

## 2022-01-14 RX ADMIN — MORPHINE SULFATE 4 MG: 4 INJECTION INTRAVENOUS at 15:10

## 2022-01-14 NOTE — ED NOTES
Reviewed discharge instructions with pt. Pt verbalized understanding. Pt ambulatory, in no distress upon discharge.

## 2022-01-14 NOTE — ED PROVIDER NOTES
EMERGENCY DEPARTMENT HISTORY AND PHYSICAL EXAM    Date: (Not on file)  Patient Name: Zeyad Lowry    History of Presenting Illness     Chief Complaint   Patient presents with    Abdominal Pain       History Provided By: Patient    Additional History (Context): Zeyad Lowry is a 56 yo female with past medical history significant for depression and status post partial hysterectomy presents to the ER with complaints of nausea, vomiting, and abdominal pain that started yesterday evening about an hour and a half after eating chicken wings at work. She denies any diarrhea. She took ibuprofen this morning with some improvement. No fever, chills, urinary complaints. No exposure to ill contacts or COVID-19. She has been fully vaccinated against COVID. Denies any blood or bilious vomitus. PCP: Spenser Barker MD    Current Facility-Administered Medications   Medication Dose Route Frequency Provider Last Rate Last Admin    ondansetron [facility]) injection 4 mg  4 mg IntraVENous NOW DAMON Hernandez        morphine injection 4 mg  4 mg IntraVENous NOW Casandra Spicer FNP        sodium chloride 0.9 % bolus infusion 1,000 mL  1,000 mL IntraVENous ONCE Margo Spicer FNP         Current Outpatient Medications   Medication Sig Dispense Refill    dexAMETHasone (Decadron) 6 mg tablet Take one tablet 1 Tablet 0    phenoL (Chloraseptic) 0.5 % spra 3 Actuation(s) by Mucous Membrane route every six to eight (6-8) hours as needed for Pain. 177 mL 0    sertraline (Zoloft) 50 mg tablet Take  by mouth daily.  amoxicillin-clavulanate (Augmentin) 875-125 mg per tablet Take 1 Tablet by mouth two (2) times a day. 20 Tablet 0    pseudoephedrine CR (Sudafed 12 Hour) 120 mg CR tablet Take 1 Tablet by mouth two (2) times daily as needed for Congestion.  14 Tablet 0       Past History     Past Medical History:  Past Medical History:   Diagnosis Date    Back pain     Fibroids     Heavy menses     and prolonged    Psychiatric disorder     depression       Past Surgical History:  Past Surgical History:   Procedure Laterality Date    HX HYSTERECTOMY  01/2016    HX ORTHOPAEDIC      ankle surgery       Family History:  Family History   Problem Relation Age of Onset    Diabetes Mother     Diabetes Father        Social History:  Social History     Tobacco Use    Smoking status: Former Smoker    Smokeless tobacco: Never Used    Tobacco comment: 1 pk/week, quit 4 months ago   Substance Use Topics    Alcohol use: Yes     Comment: states occassional    Drug use: No       Allergies: Allergies   Allergen Reactions    Gabapentin Nausea Only         Review of Systems     Review of Systems   Constitutional: Negative for chills and fever. HENT: Negative for nasal congestion, sore throat, rhinorrhea  Eyes: Negative. Respiratory: negative  cough and negative for shortness of breath. Cardiovascular: Negative for chest pain and palpitations. Gastrointestinal: Positive for abdominal pain, nausea, and vomiting. Negative for constipation or diarrhea. Genitourinary: Negative for difficulty urinating, hematuria, and flank pain. Musculoskeletal: Negative for back pain. Negative for gait problem and neck pain. Skin: Negative for rash. Allergic/Immunologic: Negative. Neurological: Negative for dizziness, weakness, numbness and headaches. Psychiatric/Behavioral: Negative. All other systems reviewed and are negative. All Other Systems Negative  Physical Exam     Vitals:    01/14/22 1350   BP: (!) 119/96   Pulse: 74   Resp: 14   Temp: 98.5 °F (36.9 °C)   SpO2: 99%   Weight: 64.9 kg (143 lb)   Height: 5' 5\" (1.651 m)     Physical Exam  Vitals and nursing note reviewed. Constitutional:       General: She is in acute distress. Appearance: Normal appearance. She is well-developed. She is not ill-appearing, toxic-appearing or diaphoretic. HENT:      Head: Normocephalic and atraumatic.       Nose: Nose normal.      Mouth/Throat:      Mouth: Mucous membranes are moist.      Pharynx: Oropharynx is clear. Eyes:      General: Lids are normal. Vision grossly intact. No scleral icterus. Conjunctiva/sclera: Conjunctivae normal.   Cardiovascular:      Rate and Rhythm: Normal rate and regular rhythm. Pulses: Normal pulses. Heart sounds: Normal heart sounds. Pulmonary:      Effort: Pulmonary effort is normal. No respiratory distress. Breath sounds: Normal breath sounds. No stridor. No wheezing, rhonchi or rales. Chest:      Chest wall: No tenderness. Abdominal:      Palpations: Abdomen is soft. Tenderness: There is abdominal tenderness in the right upper quadrant, epigastric area and left upper quadrant. There is no right CVA tenderness, left CVA tenderness or guarding. Musculoskeletal:         General: Normal range of motion. Cervical back: Full passive range of motion without pain, normal range of motion and neck supple. No tenderness. Lymphadenopathy:      Cervical: No cervical adenopathy. Skin:     General: Skin is warm and dry. Capillary Refill: Capillary refill takes less than 2 seconds. Neurological:      General: No focal deficit present. Mental Status: She is alert and oriented to person, place, and time. Psychiatric:         Mood and Affect: Mood normal.         Behavior: Behavior normal. Behavior is cooperative. Diagnostic Study Results     Labs -   No results found for this or any previous visit (from the past 12 hour(s)). Radiologic Studies -   CT ABD PELV W CONT    (Results Pending)     CT Results  (Last 48 hours)    None        CXR Results  (Last 48 hours)    None            Medical Decision Making   I am the first provider for this patient. I reviewed the vital signs, available nursing notes, past medical history, past surgical history, family history and social history. Vital Signs-Reviewed the patient's vital signs.     Records Reviewed: Nursing notes, old medical records and any previous labs, imaging, visits, consultations pertinent to patient care    Procedures:  Procedures      ED Course: Progress Notes, Reevaluation, and Consults:  1:48 PM  Initial assessment performed. The patients presenting problems have been discussed, and they/their family are in agreement with the care plan formulated and outlined with them. I have encouraged them to ask questions as they arise throughout their visit. 4:00 PM Patient reassessed and she is feeling much better. Abdomen is soft and nontender. No evidence of peritoneal signs on reassessment. CBC shows no leukocytosis or anemia n. Urine is negative. CMP is unremarkable and hCG is negative for pregnancy. CT of the abdomen and pelvis shows no acute finding with a small mount of free fluid in the pelvis. Patient may have remotely been exposed to [address] we will have her quarantine due to her symptoms and will give her Zofran as needed for nausea. ER return precautions discussed and close follow-up with PCP. Doubt acute surgical process. Repeat abdominal exam reveals soft and non-tender abdomen. No new symptoms on re-evaluation. Patient has no pain and with improvement in nausea. I do not feel that any additional emergent imaging is warranted at this time. Will discharge home with supportive treatment and close follow-up with PCP in 2-3 days. GI referral given. Provider Notes (Medical Decision Making):   Patient presents ambulatory in no acute distress, well-hydrated, non-toxic in appearance, with normal vitals. Benign exam of abdomen with tenderness in the left upper quadrant and reproducible pain in the epigastrium right upper quadrant with no peritoneal signs. Will obtain appropriate studies to evaluate patient's complaints and treat symptomatically.  Will disposition after reassessment assuming no clinical change or worsening and appropriate response to symptomatic treatment. ED RECONCILIATION:  Current Facility-Administered Medications   Medication Dose Route Frequency    ondansetron (ZOFRAN) injection 4 mg  4 mg IntraVENous NOW    morphine injection 4 mg  4 mg IntraVENous NOW    sodium chloride 0.9 % bolus infusion 1,000 mL  1,000 mL IntraVENous ONCE     Current Outpatient Medications   Medication Sig    dexAMETHasone (Decadron) 6 mg tablet Take one tablet    phenoL (Chloraseptic) 0.5 % spra 3 Actuation(s) by Mucous Membrane route every six to eight (6-8) hours as needed for Pain.  sertraline (Zoloft) 50 mg tablet Take  by mouth daily.  amoxicillin-clavulanate (Augmentin) 875-125 mg per tablet Take 1 Tablet by mouth two (2) times a day.  pseudoephedrine CR (Sudafed 12 Hour) 120 mg CR tablet Take 1 Tablet by mouth two (2) times daily as needed for Congestion. Disposition:  Home in stable condition. DISCHARGE NOTE:     Patient has been reexamined. Patient has no new complaints, changes, or physical findings. Patient demonstrates understanding of current diagnoses and is in agreement with the treatment plan. They are advised that while the likelihood of serious underlying condition is low at this point given the evaluation performed today, we cannot fully rule it out. They are advised to immediately return with any new symptoms or worsening of current condition. Care plan outlined and precautions discussed. Discussed proper way to take medications. Medication use, risk/benefit, side effects and precautions discussed in detail. Discussed treatment plan, return precautions, symptomatic relief, and expected time to improvement. All questions answered. Patient is stable for discharge and outpatient management. Patient is ready to go home. Follow-up Information    None         Current Discharge Medication List                Diagnosis     Clinical Impression: No diagnosis found.     Dictation disclaimer:  Please note that this dictation was completed with Dragon, the computer voice recognition software. Quite often unanticipated grammatical, syntax, homophones, and other interpretive errors are inadvertently transcribed by the computer software. Please disregard these errors. Please excuse any errors that have escaped final proofreading.

## 2022-01-14 NOTE — Clinical Note
[facility]  [facility] EMERGENCY DEPT  [address] [id number] [phone]    Work/School Note    Date: 1/14/2022     To Whom It May concern:    Krystle President was evaluated by the following provider(s):  Attending Provider: Alwin Hatchet, MD  Nurse Practitioner: DAMON Fontanez. COVID19 virus is suspected. Per the CDC guidelines we recommend home isolation until the following conditions are all met:    1. At least five days have passed since symptoms first appeared and/or had a close exposure,   2. After home isolation for five days, wearing a mask around others for the next five days,  3. At least 24 have passed since last fever without the use of fever-reducing medications and  4.  Symptoms (eg cough, shortness of breath) have improved      Sincerely,          Joan Cushing, FNP

## 2022-01-14 NOTE — ED TRIAGE NOTES
Patient states she ate some chicken wings at [employer] where she works yesterday. 3 hours after eating she started having stomach pain and vomiting.